# Patient Record
Sex: FEMALE | Race: WHITE | Employment: UNEMPLOYED | URBAN - METROPOLITAN AREA
[De-identification: names, ages, dates, MRNs, and addresses within clinical notes are randomized per-mention and may not be internally consistent; named-entity substitution may affect disease eponyms.]

---

## 2017-05-11 ENCOUNTER — HOSPITAL ENCOUNTER (OUTPATIENT)
Dept: WOMENS IMAGING | Age: 62
Discharge: HOME | End: 2017-05-11
Payer: COMMERCIAL

## 2017-05-11 DIAGNOSIS — Z13.9 SCREENING: ICD-10-CM

## 2017-05-11 PROCEDURE — G0202 SCR MAMMO BI INCL CAD: HCPCS

## 2018-06-06 ENCOUNTER — HOSPITAL ENCOUNTER (OUTPATIENT)
Dept: ULTRASOUND IMAGING | Age: 63
Discharge: HOME | End: 2018-06-08
Payer: COMMERCIAL

## 2018-06-06 DIAGNOSIS — R10.9 ABDOMINAL PAIN, UNSPECIFIED ABDOMINAL LOCATION: ICD-10-CM

## 2018-06-06 PROCEDURE — 76705 ECHO EXAM OF ABDOMEN: CPT

## 2019-03-20 ENCOUNTER — HOSPITAL ENCOUNTER (OUTPATIENT)
Dept: WOMENS IMAGING | Age: 64
Discharge: HOME | End: 2019-03-22
Payer: COMMERCIAL

## 2019-03-20 DIAGNOSIS — Z12.31 ENCOUNTER FOR SCREENING MAMMOGRAM FOR BREAST CANCER: ICD-10-CM

## 2019-03-20 PROCEDURE — 77067 SCR MAMMO BI INCL CAD: CPT

## 2020-02-10 ENCOUNTER — OFFICE VISIT (OUTPATIENT)
Dept: GASTROENTEROLOGY | Age: 65
End: 2020-02-10
Payer: COMMERCIAL

## 2020-02-10 VITALS
BODY MASS INDEX: 30.96 KG/M2 | HEART RATE: 88 BPM | OXYGEN SATURATION: 91 % | WEIGHT: 164 LBS | HEIGHT: 61 IN | TEMPERATURE: 98.2 F

## 2020-02-10 PROCEDURE — 1036F TOBACCO NON-USER: CPT | Performed by: INTERNAL MEDICINE

## 2020-02-10 PROCEDURE — 99204 OFFICE O/P NEW MOD 45 MIN: CPT | Performed by: INTERNAL MEDICINE

## 2020-02-10 PROCEDURE — 3017F COLORECTAL CA SCREEN DOC REV: CPT | Performed by: INTERNAL MEDICINE

## 2020-02-10 PROCEDURE — G8427 DOCREV CUR MEDS BY ELIG CLIN: HCPCS | Performed by: INTERNAL MEDICINE

## 2020-02-10 PROCEDURE — G8400 PT W/DXA NO RESULTS DOC: HCPCS | Performed by: INTERNAL MEDICINE

## 2020-02-10 PROCEDURE — 1123F ACP DISCUSS/DSCN MKR DOCD: CPT | Performed by: INTERNAL MEDICINE

## 2020-02-10 PROCEDURE — 1090F PRES/ABSN URINE INCON ASSESS: CPT | Performed by: INTERNAL MEDICINE

## 2020-02-10 PROCEDURE — G8484 FLU IMMUNIZE NO ADMIN: HCPCS | Performed by: INTERNAL MEDICINE

## 2020-02-10 PROCEDURE — G8417 CALC BMI ABV UP PARAM F/U: HCPCS | Performed by: INTERNAL MEDICINE

## 2020-02-10 PROCEDURE — 4040F PNEUMOC VAC/ADMIN/RCVD: CPT | Performed by: INTERNAL MEDICINE

## 2020-02-10 RX ORDER — OMEPRAZOLE 40 MG/1
40 CAPSULE, DELAYED RELEASE ORAL DAILY
Qty: 30 CAPSULE | Refills: 3 | Status: SHIPPED | OUTPATIENT
Start: 2020-02-10 | End: 2021-05-20

## 2020-02-10 RX ORDER — POLYETHYLENE GLYCOL 3350 17 G/17G
17 POWDER, FOR SOLUTION ORAL DAILY
Qty: 510 G | Refills: 3 | Status: SHIPPED
Start: 2020-02-10 | End: 2020-02-18 | Stop reason: SINTOL

## 2020-02-10 RX ORDER — METOCLOPRAMIDE 5 MG/1
5 TABLET ORAL 4 TIMES DAILY
Qty: 120 TABLET | Refills: 3 | Status: SHIPPED | OUTPATIENT
Start: 2020-02-10 | End: 2020-02-18

## 2020-02-10 NOTE — PROGRESS NOTES
There is no abdominal tenderness. There is no guarding or rebound. Musculoskeletal: Normal range of motion. Lymphadenopathy:      Cervical: No cervical adenopathy. Neurological:      Mental Status: She is alert and oriented to person, place, and time. Psychiatric:         Behavior: Behavior normal.         Thought Content: Thought content normal.         Judgment: Judgment normal.       Laboratory, Pathology, Radiology reviewed indetail with relevant important investigations summarized below:  No results found for: WBC, HGB, HCT, MCV, PLT  No results found for: ALT, AST, GGT, ALKPHOS, BILITOT    Ultrasound abdomen: 6/6/2018: Cholelithiasis with single 1.4 cm mobile stone in the gallbladder    Endoscopic investigations: EGD and colonoscopy April 2019 at [address] [place] [address] St:  71 y.o. female with significant reflux symptoms, easy regurgitation, vomiting in the setting of gastroparesis. No history of diabetes indicating idiopathic gastroparesis. 1. Gastroparesis  =Gastroparesis dietary modifications advised:   Avoid eating 3 hrs before bedtime   Small low fat meals 4-5 times per day (no large meals)   Take fluids throughout the course of the meal   Avoid carbonated drinks   Minimize fiber and fat intake   Try to sit or walk for 1-2 hrs after meals  = Rx for Reglan 5 mg Po QID provided to patient  Long-term or high-dose use risks discussed including chronic use of metoclopramide has been linked to tardive dyskinesia, which may include involuntary and repetitive movements of the body, even after the drugs are no longer taken. greatest risk include the elderly, especially older women, and people who have been on the drug for a long time. These symptoms are rarely reversible and there is no known treatment. However, in some patients, symptoms may lessen or resolve after metoclopramide treatment is stopped. 2. History of esophagitis  5.  Gastroesophageal reflux disease, esophagitis presence not specified  - EGD  -Change from pantoprazole to omeprazole, importance of regular intake emphasized  -Antireflux lifestyle maneuvers  - omeprazole (PRILOSEC) 40 MG delayed release capsule; Take 1 capsule by mouth daily  Dispense: 30 capsule; Refill: 3    3. Non-intractable vomiting without nausea, unspecified vomiting type    4. Slow transit constipation  - polyethylene glycol (MIRALAX) powder; Take 17 g by mouth daily  Dispense: 510 g; Refill: 3    Return in about 2 months (around 4/10/2020). Randa Marvin MD   Staff Gastroenterologist  [facility]    Please note this report has been partially produced using speech recognition software and may cause contain errors related to thatsystem including grammar, punctuation and spelling as well as words and phrases that may seem inappropriate. If there are questions or concerns please feel free to contact me to clarify.

## 2020-02-18 ENCOUNTER — OFFICE VISIT (OUTPATIENT)
Dept: FAMILY MEDICINE CLINIC | Age: 65
End: 2020-02-18
Payer: COMMERCIAL

## 2020-02-18 VITALS
BODY MASS INDEX: 30.14 KG/M2 | TEMPERATURE: 97 F | HEART RATE: 90 BPM | OXYGEN SATURATION: 98 % | HEIGHT: 62 IN | DIASTOLIC BLOOD PRESSURE: 68 MMHG | SYSTOLIC BLOOD PRESSURE: 120 MMHG | WEIGHT: 163.8 LBS | RESPIRATION RATE: 16 BRPM

## 2020-02-18 PROBLEM — K21.9 GERD (GASTROESOPHAGEAL REFLUX DISEASE): Chronic | Status: ACTIVE | Noted: 2020-02-18

## 2020-02-18 PROBLEM — K92.0 HEMATEMESIS WITH NAUSEA: Status: ACTIVE | Noted: 2020-02-18

## 2020-02-18 PROBLEM — K59.09 CHRONIC CONSTIPATION: Chronic | Status: ACTIVE | Noted: 2020-02-18

## 2020-02-18 PROBLEM — K80.20 CALCULUS OF GALLBLADDER WITHOUT CHOLECYSTITIS WITHOUT OBSTRUCTION: Status: ACTIVE | Noted: 2020-02-18

## 2020-02-18 PROBLEM — K31.84 GASTROPARESIS: Chronic | Status: ACTIVE | Noted: 2020-02-18

## 2020-02-18 PROBLEM — Z82.49 FAMILY HISTORY OF PREMATURE CORONARY HEART DISEASE: Chronic | Status: ACTIVE | Noted: 2020-02-18

## 2020-02-18 PROBLEM — Z87.898 HISTORY OF ALCOHOL USE DISORDER: Status: ACTIVE | Noted: 2020-02-18

## 2020-02-18 PROBLEM — G31.84 MILD COGNITIVE IMPAIRMENT: Chronic | Status: ACTIVE | Noted: 2020-02-18

## 2020-02-18 PROCEDURE — G8482 FLU IMMUNIZE ORDER/ADMIN: HCPCS | Performed by: FAMILY MEDICINE

## 2020-02-18 PROCEDURE — G8400 PT W/DXA NO RESULTS DOC: HCPCS | Performed by: FAMILY MEDICINE

## 2020-02-18 PROCEDURE — 90732 PPSV23 VACC 2 YRS+ SUBQ/IM: CPT | Performed by: FAMILY MEDICINE

## 2020-02-18 PROCEDURE — 93000 ELECTROCARDIOGRAM COMPLETE: CPT | Performed by: FAMILY MEDICINE

## 2020-02-18 PROCEDURE — G8417 CALC BMI ABV UP PARAM F/U: HCPCS | Performed by: FAMILY MEDICINE

## 2020-02-18 PROCEDURE — 1123F ACP DISCUSS/DSCN MKR DOCD: CPT | Performed by: FAMILY MEDICINE

## 2020-02-18 PROCEDURE — 90662 IIV NO PRSV INCREASED AG IM: CPT | Performed by: FAMILY MEDICINE

## 2020-02-18 PROCEDURE — 90471 IMMUNIZATION ADMIN: CPT | Performed by: FAMILY MEDICINE

## 2020-02-18 PROCEDURE — 90472 IMMUNIZATION ADMIN EACH ADD: CPT | Performed by: FAMILY MEDICINE

## 2020-02-18 PROCEDURE — 1036F TOBACCO NON-USER: CPT | Performed by: FAMILY MEDICINE

## 2020-02-18 PROCEDURE — 99205 OFFICE O/P NEW HI 60 MIN: CPT | Performed by: FAMILY MEDICINE

## 2020-02-18 PROCEDURE — G8427 DOCREV CUR MEDS BY ELIG CLIN: HCPCS | Performed by: FAMILY MEDICINE

## 2020-02-18 PROCEDURE — 4040F PNEUMOC VAC/ADMIN/RCVD: CPT | Performed by: FAMILY MEDICINE

## 2020-02-18 PROCEDURE — 1090F PRES/ABSN URINE INCON ASSESS: CPT | Performed by: FAMILY MEDICINE

## 2020-02-18 PROCEDURE — 3017F COLORECTAL CA SCREEN DOC REV: CPT | Performed by: FAMILY MEDICINE

## 2020-02-18 RX ORDER — ATORVASTATIN CALCIUM 40 MG/1
40 TABLET, FILM COATED ORAL DAILY
Qty: 90 TABLET | Refills: 4 | Status: SHIPPED | OUTPATIENT
Start: 2020-02-18

## 2020-02-18 RX ORDER — METOCLOPRAMIDE 5 MG/1
5 TABLET ORAL 4 TIMES DAILY
Qty: 1 TABLET | Refills: 0
Start: 2020-02-18 | End: 2021-05-20

## 2020-02-18 ASSESSMENT — PATIENT HEALTH QUESTIONNAIRE - PHQ9
2. FEELING DOWN, DEPRESSED OR HOPELESS: 1
SUM OF ALL RESPONSES TO PHQ QUESTIONS 1-9: 1
1. LITTLE INTEREST OR PLEASURE IN DOING THINGS: 0
DEPRESSION UNABLE TO ASSESS: FUNCTIONAL CAPACITY MOTIVATION LIMITS ACCURACY
SUM OF ALL RESPONSES TO PHQ QUESTIONS 1-9: 1
SUM OF ALL RESPONSES TO PHQ9 QUESTIONS 1 & 2: 1

## 2020-02-18 NOTE — PROGRESS NOTES
symptoms. She has chronic intermittent left chest pain which is stabbing in nature lasting a few seconds nonradiating and not associated with activity. She states that she had a cardiac catheterization 1 year ago at [facility]. Has h/o hypokalemia. No other questions and or concerns for today's visit    Review of Systems No fevers, chills, sweats. No unintended weight loss. Has frequent abdominal pain, nausea, vomiting, and constipation. 2 weeks ago had a few episodes of coffee ground emesis No bloody stools, black tarry stools. No rashes. No swollen glands. Past Medical History:   Diagnosis Date    Chronic back pain     Hypertension     Measles     Mumps     Osteoarthritis      Past Surgical History:   Procedure Laterality Date    HYSTERECTOMY, TOTAL ABDOMINAL      TUBAL LIGATION       Social History     Socioeconomic History    Marital status:       Spouse name: Not on file    Number of children: Not on file    Years of education: Not on file    Highest education level: Not on file   Occupational History    Not on file   Social Needs    Financial resource strain: Not on file    Food insecurity:     Worry: Not on file     Inability: Not on file    Transportation needs:     Medical: Not on file     Non-medical: Not on file   Tobacco Use    Smoking status: Never Smoker    Smokeless tobacco: Never Used   Substance and Sexual Activity    Alcohol use: No     Alcohol/week: 0.0 standard drinks    Drug use: No    Sexual activity: Not Currently   Lifestyle    Physical activity:     Days per week: Not on file     Minutes per session: Not on file    Stress: Not on file   Relationships    Social connections:     Talks on phone: Not on file     Gets together: Not on file     Attends Christianity service: Not on file     Active member of club or organization: Not on file     Attends meetings of clubs or organizations: Not on file     Relationship status: Not on file    Intimate partner violence:     Fear of current or ex partner: Not on file     Emotionally abused: Not on file     Physically abused: Not on file     Forced sexual activity: Not on file   Other Topics Concern    Not on file   Social History Narrative    Not on file     Family History   Problem Relation Age of Onset    Cancer Mother     Heart Attack Father          when patient was 1years old   Gagan Carreno Cancer Sister     Heart Attack Brother         age 58    Stroke Brother     Colon Cancer Neg Hx      Allergies   Allergen Reactions    Carafate [Sucralfate] Other (See Comments)     Patient states that the medication make her have hallucinations and dizziness.  Lactose Nausea And Vomiting    Codeine      itching     Current Outpatient Medications   Medication Sig Dispense Refill    zoster recombinant adjuvanted vaccine (SHINGRIX) 50 MCG/0.5ML SUSR injection Inject 0.5 mLs into the muscle once for 1 dose 50 MCG IM then repeat 2-6 months. 1 each 1    metoclopramide (REGLAN) 5 MG tablet Take 1 tablet by mouth 4 times daily 1 tablet 0    atorvastatin (LIPITOR) 40 MG tablet Take 1 tablet by mouth daily 90 tablet 4    omeprazole (PRILOSEC) 40 MG delayed release capsule Take 1 capsule by mouth daily 30 capsule 3    Cholecalciferol (VITAMIN D3) 2000 UNITS TABS   2     No current facility-administered medications for this visit. PMH, Surgical Hx, Family Hx, and Social Hxreviewed and updated. Health Maintenance reviewed. Objective    Vitals:    20 1508   BP: 120/68   Site: Left Upper Arm   Position: Sitting   Cuff Size: Medium Adult   Pulse: 90   Resp: 16   Temp: 97 °F (36.1 °C)   TempSrc: Tympanic   SpO2: 98%   Weight: 163 lb 12.8 oz (74.3 kg)   Height: 5' 1.5\" (1.562 m)        Physical Exam  Constitutional:       Appearance: She is well-developed. HENT:      Head: Normocephalic. Left Ear: Tympanic membrane, ear canal and external ear normal. There is no impacted cerumen.    Eyes:      Conjunctiva/sclera: with nausea        See above. Upper GI tomorrow. CBC With Auto Differential [id number] Custom]   - Future Order    Comprehensive Metabolic Panel [[id number] Custom]   - Future Order    US LIVER SPLEEN [[id number] Custom]   - Future Order         Mild cognitive impairment        Subjective complaint. Not observed today. Will review incrementally. Meanwhile labs are ordered. TSH Without Reflex [[id number] Custom]   - Future Order    Vitamin D 25 Hydroxy [[id number] Custom]   - Future Order    Vitamin B12 & Folate [[id number] Custom]   - Future Order         Post-menopausal        DEXA Bone Density Axial Skeleton [[id number] Custom]   - Future Order         Screening, lipid        Lipid, Fasting [[id number] Custom]   - Future Order         Encounter for screening for diabetes mellitus        Glucose, Fasting [[id number] Custom]   - Future Order         Need for prophylactic vaccination and inoculation against varicella        zoster recombinant adjuvanted vaccine [facility]) 50 MCG/0.5ML SUSR injection [[id number]]           Upper abdominal pain        Comprehensive Metabolic Panel [[id number] Custom]   - Future Order    US LIVER SPLEEN [[id number] Custom]   - Future Order         Calculus of gallbladder without cholecystitis without obstruction        US LIVER SPLEEN [[id number] Custom]   - Future Order                 Reviewed with the patient: all disease processes, current clinical status, medications, activities and diet.      Side effects, adverse effects of the medication prescribed today, as well as treatment plan/ rationale and result expectations have been discussed with the patient who expresses understanding and desires to proceed.     Close follow up to evaluate treatment results and for coordination of care. I have reviewed the patient's medical history in detail and updated the computerized patient record. More than 50% of the 1 hour appointment was spent in face-to-face counseling, education and care coordination.     Please note this report has been Dispense:  1 tablet     Refill:  0    atorvastatin (LIPITOR) 40 MG tablet     Sig: Take 1 tablet by mouth daily     Dispense:  90 tablet     Refill:  4     Medications Discontinued During This Encounter   Medication Reason    metoclopramide (REGLAN) 5 MG tablet LIST CLEANUP    gabapentin (NEURONTIN) 600 MG tablet LIST CLEANUP    tiZANidine (ZANAFLEX) 4 MG tablet LIST CLEANUP    gabapentin (NEURONTIN) 600 MG tablet LIST CLEANUP    hydrOXYzine (VISTARIL) 50 MG capsule LIST CLEANUP    metoclopramide (REGLAN) 10 MG tablet LIST CLEANUP    polyethylene glycol (MIRALAX) powder Side effects     Return in about 1 week (around 2/25/2020) for multiple. Controlled Substance Monitoring:    Acute and Chronic Pain Monitoring:   RX Monitoring 2/10/2016   Attestation The Prescription Monitoring Report for this patient was reviewed today. Periodic Controlled Substance Monitoring Possible medication side effects, risk of tolerance and/or dependence, and alternative treatments discussed; No signs of potential drug abuse or diversion identified. ;Random urine drug screen sent today.            Keyana Heart MD

## 2020-02-18 NOTE — PROGRESS NOTES
Vaccine Information Sheet, \"Influenza - Inactivated\"  given to Cara Haines, or parent/legal guardian of  Cara Haines and verbalized understanding. Patient responses:    Have you ever had a reaction to a flu vaccine? No  Are you able to eat eggs without adverse effects? Yes  Do you have any current illness? No  Have you ever had [place] Syndrome? No    Flu vaccine given per order. Please see immunization tab.

## 2020-02-19 ENCOUNTER — OUTSIDE SERVICES (OUTPATIENT)
Dept: GASTROENTEROLOGY | Age: 65
End: 2020-02-19
Payer: COMMERCIAL

## 2020-02-19 PROCEDURE — 43239 EGD BIOPSY SINGLE/MULTIPLE: CPT | Performed by: INTERNAL MEDICINE

## 2020-02-19 RX ORDER — OMEPRAZOLE 40 MG/1
40 CAPSULE, DELAYED RELEASE ORAL
Qty: 60 CAPSULE | Refills: 2 | Status: SHIPPED | OUTPATIENT
Start: 2020-02-19 | End: 2021-05-20

## 2020-02-19 RX ORDER — SUCRALFATE ORAL 1 G/10ML
1 SUSPENSION ORAL 4 TIMES DAILY
Qty: 1200 ML | Refills: 3 | Status: SHIPPED | OUTPATIENT
Start: 2020-02-19 | End: 2021-05-20

## 2020-02-19 NOTE — OP NOTE
second portion of the duodenum demonstrated normal mucosa. The scope was then withdrawn back into the stomach, it was decompressed, and the scope was completely withdrawn. The patient tolerated the procedure well and was taken to the post anesthesia care unit in good condition. EBL: None  Complication: None  Specimen Sent: Yes    Impression:    - Lower esophageal ulcer, LA grade C/D esophagitis, 4 cm hiatal hernia, normal stomach and normal duodenum    Recommendations:   -Antireflux lifestyle  -Gastroparesis diet  -Increase PPI to twice daily dosage  -Liquid Carafate 1 g 4 times daily for 2 to 4 weeks  -Follow-up in GI clinic with Florentino Mcarthur NP in 4 - 6 weeks, Call [phone] to scheduled for follow-up appointment.     MD LIZZETTE [facility]

## 2020-02-21 ENCOUNTER — TELEPHONE (OUTPATIENT)
Dept: GASTROENTEROLOGY | Age: 65
End: 2020-02-21

## 2020-02-21 NOTE — TELEPHONE ENCOUNTER
----- Message from Jed Delgado MD sent at 2/21/2020  1:07 PM EST -----  Esophageal biopsies are negative for Dickerson's or for fungal organisms

## 2020-03-04 ENCOUNTER — HOSPITAL ENCOUNTER (OUTPATIENT)
Dept: ULTRASOUND IMAGING | Age: 65
Discharge: HOME | End: 2020-03-06
Payer: COMMERCIAL

## 2020-03-04 ENCOUNTER — HOSPITAL ENCOUNTER (OUTPATIENT)
Dept: WOMENS IMAGING | Age: 65
Discharge: HOME | End: 2020-03-06
Payer: COMMERCIAL

## 2020-03-04 DIAGNOSIS — K92.0 HEMATEMESIS WITH NAUSEA: ICD-10-CM

## 2020-03-04 DIAGNOSIS — R10.10 UPPER ABDOMINAL PAIN: ICD-10-CM

## 2020-03-04 DIAGNOSIS — G31.84 MILD COGNITIVE IMPAIRMENT: Chronic | ICD-10-CM

## 2020-03-04 DIAGNOSIS — Z13.1 ENCOUNTER FOR SCREENING FOR DIABETES MELLITUS: ICD-10-CM

## 2020-03-04 DIAGNOSIS — Z13.220 SCREENING, LIPID: ICD-10-CM

## 2020-03-04 DIAGNOSIS — E87.6 HYPOKALEMIA: ICD-10-CM

## 2020-03-04 LAB
ALBUMIN SERPL-MCNC: 4.1 G/DL (ref 3.5–4.6)
ALP BLD-CCNC: 67 U/L (ref 40–130)
ALT SERPL-CCNC: 7 U/L (ref 0–33)
ANION GAP SERPL CALCULATED.3IONS-SCNC: 13 MEQ/L (ref 9–15)
ANISOCYTOSIS: ABNORMAL
AST SERPL-CCNC: 14 U/L (ref 0–35)
ATYPICAL LYMPHOCYTE RELATIVE PERCENT: 1 %
BASOPHILS ABSOLUTE: 0 K/UL (ref 0–0.2)
BASOPHILS RELATIVE PERCENT: 1.1 %
BILIRUB SERPL-MCNC: 0.4 MG/DL (ref 0.2–0.7)
BUN BLDV-MCNC: 12 MG/DL (ref 8–23)
CALCIUM SERPL-MCNC: 9.4 MG/DL (ref 8.5–9.9)
CHLORIDE BLD-SCNC: 102 MEQ/L (ref 95–107)
CHOLESTEROL, FASTING: 229 MG/DL (ref 0–199)
CO2: 27 MEQ/L (ref 20–31)
CREAT SERPL-MCNC: 0.83 MG/DL (ref 0.5–0.9)
EOSINOPHILS ABSOLUTE: 0.4 K/UL (ref 0–0.7)
EOSINOPHILS RELATIVE PERCENT: 6 %
FOLATE: 17.8 NG/ML (ref 7.3–26.1)
GFR AFRICAN AMERICAN: >60
GFR NON-AFRICAN AMERICAN: >60
GLOBULIN: 3.1 G/DL (ref 2.3–3.5)
GLUCOSE FASTING: 110 MG/DL (ref 70–99)
HCT VFR BLD CALC: 34.4 % (ref 37–47)
HDLC SERPL-MCNC: 76 MG/DL (ref 40–59)
HEMOGLOBIN: 10.6 G/DL (ref 12–16)
LDL CHOLESTEROL CALCULATED: 128 MG/DL (ref 0–129)
LYMPHOCYTES ABSOLUTE: 2.3 K/UL (ref 1–4.8)
LYMPHOCYTES RELATIVE PERCENT: 36 %
MCH RBC QN AUTO: 20.9 PG (ref 27–31.3)
MCHC RBC AUTO-ENTMCNC: 30.7 % (ref 33–37)
MCV RBC AUTO: 68.1 FL (ref 82–100)
MICROCYTES: ABNORMAL
MONOCYTES ABSOLUTE: 0.3 K/UL (ref 0.2–0.8)
MONOCYTES RELATIVE PERCENT: 3.8 %
NEUTROPHILS ABSOLUTE: 3.4 K/UL (ref 1.4–6.5)
NEUTROPHILS RELATIVE PERCENT: 54 %
OVALOCYTES: ABNORMAL
PDW BLD-RTO: 17.3 % (ref 11.5–14.5)
PLATELET # BLD: 347 K/UL (ref 130–400)
PLATELET SLIDE REVIEW: NORMAL
POIKILOCYTES: ABNORMAL
POTASSIUM SERPL-SCNC: 4.3 MEQ/L (ref 3.4–4.9)
RBC # BLD: 5.05 M/UL (ref 4.2–5.4)
SODIUM BLD-SCNC: 142 MEQ/L (ref 135–144)
TOTAL PROTEIN: 7.2 G/DL (ref 6.3–8)
TRIGLYCERIDE, FASTING: 124 MG/DL (ref 0–150)
TSH SERPL DL<=0.05 MIU/L-ACNC: 4.25 UIU/ML (ref 0.44–3.86)
VITAMIN B-12: 355 PG/ML (ref 232–1245)
VITAMIN D 25-HYDROXY: 10.4 NG/ML (ref 30–100)
WBC # BLD: 6.3 K/UL (ref 4.8–10.8)

## 2020-03-04 PROCEDURE — 77080 DXA BONE DENSITY AXIAL: CPT

## 2020-03-04 PROCEDURE — 76705 ECHO EXAM OF ABDOMEN: CPT

## 2020-05-26 ENCOUNTER — TELEPHONE (OUTPATIENT)
Dept: GASTROENTEROLOGY | Age: 65
End: 2020-05-26

## 2020-05-26 NOTE — TELEPHONE ENCOUNTER
You may schedule a face-to-face visit with [facility] for this patient. Please check which day [facility] is scheduling face-to-face visits.     Cheri Shah

## 2021-05-20 ENCOUNTER — OFFICE VISIT (OUTPATIENT)
Dept: GASTROENTEROLOGY | Age: 66
End: 2021-05-20
Payer: COMMERCIAL

## 2021-05-20 VITALS — BODY MASS INDEX: 28.82 KG/M2 | WEIGHT: 156.6 LBS | HEART RATE: 72 BPM | OXYGEN SATURATION: 99 % | HEIGHT: 62 IN

## 2021-05-20 DIAGNOSIS — Z87.19 HISTORY OF ESOPHAGITIS: ICD-10-CM

## 2021-05-20 DIAGNOSIS — R63.4 WEIGHT LOSS: ICD-10-CM

## 2021-05-20 DIAGNOSIS — R07.89 OTHER CHEST PAIN: ICD-10-CM

## 2021-05-20 DIAGNOSIS — R53.83 LETHARGY: ICD-10-CM

## 2021-05-20 DIAGNOSIS — K31.84 GASTROPARESIS: Primary | ICD-10-CM

## 2021-05-20 DIAGNOSIS — R63.0 LOSS OF APPETITE: ICD-10-CM

## 2021-05-20 PROCEDURE — 1090F PRES/ABSN URINE INCON ASSESS: CPT | Performed by: INTERNAL MEDICINE

## 2021-05-20 PROCEDURE — G8399 PT W/DXA RESULTS DOCUMENT: HCPCS | Performed by: INTERNAL MEDICINE

## 2021-05-20 PROCEDURE — G8427 DOCREV CUR MEDS BY ELIG CLIN: HCPCS | Performed by: INTERNAL MEDICINE

## 2021-05-20 PROCEDURE — G8417 CALC BMI ABV UP PARAM F/U: HCPCS | Performed by: INTERNAL MEDICINE

## 2021-05-20 PROCEDURE — 1036F TOBACCO NON-USER: CPT | Performed by: INTERNAL MEDICINE

## 2021-05-20 PROCEDURE — 99214 OFFICE O/P EST MOD 30 MIN: CPT | Performed by: INTERNAL MEDICINE

## 2021-05-20 PROCEDURE — 3017F COLORECTAL CA SCREEN DOC REV: CPT | Performed by: INTERNAL MEDICINE

## 2021-05-20 PROCEDURE — 1123F ACP DISCUSS/DSCN MKR DOCD: CPT | Performed by: INTERNAL MEDICINE

## 2021-05-20 PROCEDURE — 4040F PNEUMOC VAC/ADMIN/RCVD: CPT | Performed by: INTERNAL MEDICINE

## 2021-05-20 RX ORDER — METOCLOPRAMIDE 5 MG/1
5 TABLET ORAL 3 TIMES DAILY
Qty: 90 TABLET | Refills: 1 | Status: SHIPPED | OUTPATIENT
Start: 2021-05-20

## 2021-05-20 RX ORDER — OMEPRAZOLE 40 MG/1
40 CAPSULE, DELAYED RELEASE ORAL DAILY
Qty: 30 CAPSULE | Refills: 3 | Status: SHIPPED | OUTPATIENT
Start: 2021-05-20

## 2021-05-20 RX ORDER — SUCRALFATE 1 G/1
1 TABLET ORAL 4 TIMES DAILY
Qty: 120 TABLET | Refills: 3 | Status: SHIPPED | OUTPATIENT
Start: 2021-05-20

## 2021-05-20 NOTE — PROGRESS NOTES
12/9/2016: Severe degree of ulcerative esophagitis, grade 4, hiatal hernia, GERD, mild degree of gastritis. Pathology: No evidence of Dickerson's or dysplasia. Review of Systems   All other systems reviewed and are negative. Past medical history, past surgical history, medication list, social and familyhistory reviewed    Pulse 72, height 5' 1.5\" (1.562 m), weight 156 lb 9.6 oz (71 kg), SpO2 99 %. Physical Exam  Constitutional:       General: She is not in acute distress. Appearance: Normal appearance. She is well-developed. Eyes:      General: No scleral icterus. Cardiovascular:      Rate and Rhythm: Normal rate and regular rhythm. Pulmonary:      Effort: Pulmonary effort is normal.      Breath sounds: Normal breath sounds. Abdominal:      General: Bowel sounds are normal. There is no distension. Palpations: Abdomen is soft. There is no mass. Tenderness: There is no abdominal tenderness. There is no guarding or rebound. Musculoskeletal:         General: Normal range of motion. Lymphadenopathy:      Cervical: No cervical adenopathy. Neurological:      Mental Status: She is alert and oriented to person, place, and time. Psychiatric:         Behavior: Behavior normal.         Thought Content: Thought content normal.         Judgment: Judgment normal.       Laboratory, Pathology, Radiology reviewed in detail with relevantimportant investigations summarized below:    No results for input(s): WBC, HGB, HCT, MCV, PLT in the last 720 hours. Lab Results   Component Value Date    ALT 7 03/04/2020    AST 14 03/04/2020    ALKPHOS 67 03/04/2020    BILITOT 0.4 03/04/2020       Assessment and Plan:  Leny Mantle 76 y.o. female with known history of gastroparesis on Reglan, 4 cm hiatal hernia presents to the GI clinic with nausea and vomiting of 1 to 2 month  duration, loss of appetite, weight loss in addition to a number of vague upper and lower GI symptoms.   Patient with history of constipation in the past.   Patient requesting 10 mg Reglan dose instead of 5 mg  1. Gastroparesis  Gastroparesis diet advised    2. Other chest painlikely secondary to recurrence of esophagitis, secondary to stasis from gastroparesis  3. History of esophagitis  - EGD -to evaluate further  -Continue omeprazole (PRILOSEC) 40 MG delayed release capsule; Take 1 capsule by mouth daily  Dispense: 30 capsule; Refill: 3  -Start sucralfate (CARAFATE) 1 GM tablet; Take 1 tablet by mouth 4 times daily  Dispense: 120 tablet; Refill: 3    4. Weight loss  5. Loss of appetite  6. Lethargy  - CBC; Future  - Comprehensive Metabolic Panel; Future    Return in about 6 weeks (around 7/1/2021). Rita Kiran MD   StaffGastroenterologist  [facility]    Please note this report has been partially produced using speech recognition software and may cause/contain errors related to that system including grammar, punctuation and spelling as well as words andphrases that may seem inappropriate. If there are questions or concerns please feel free to contact me to clarify.

## 2024-02-19 ENCOUNTER — TELEPHONE (OUTPATIENT)
Dept: CARDIOLOGY | Facility: CLINIC | Age: 69
End: 2024-02-19

## 2024-02-19 NOTE — TELEPHONE ENCOUNTER
"I have had the pleasure of speaking with  this afternoon.   Per  the patient , \"I do not see Dr. Lujan anymore  I go to a vascular surgeon at [facility]. \"  Per the patient  she has  told this to Dr. Hurtado's office  multiple times    ( 's  staff number  [phone]).     I have  explained to the patient that  we would be very happy to  schedule her for an appointment and  carotid ultrasound  for review  with Dr Lujan.      She has refused.     I have attempted to contact Dr. Hurtado's office at [phone] .    I have recommended  that the staff contact the [facility]  vascular team  taking care of the patient, to obtain clearance prior to any surgical procedure   "

## 2025-02-19 PROBLEM — K21.9 GASTROESOPHAGEAL REFLUX DISEASE: Status: ACTIVE | Noted: 2019-04-15

## 2025-02-19 PROBLEM — G89.29 CHRONIC LEFT SHOULDER PAIN: Status: ACTIVE | Noted: 2023-10-12

## 2025-02-19 PROBLEM — K21.00 GASTRO-ESOPHAGEAL REFLUX DISEASE WITH ESOPHAGITIS: Status: ACTIVE | Noted: 2017-06-14

## 2025-02-19 PROBLEM — M25.512 CHRONIC LEFT SHOULDER PAIN: Status: ACTIVE | Noted: 2023-10-12

## 2025-02-19 PROBLEM — I63.9 CEREBROVASCULAR ACCIDENT (CVA) (MULTI): Status: ACTIVE | Noted: 2022-08-11

## 2025-02-19 PROBLEM — J01.90 ACUTE SINUSITIS WITH SYMPTOMS > 10 DAYS: Status: ACTIVE | Noted: 2024-12-03

## 2025-02-19 PROBLEM — Q21.12 PFO (PATENT FORAMEN OVALE) (HHS-HCC): Status: ACTIVE | Noted: 2022-12-10

## 2025-02-19 PROBLEM — I65.29 ARTERIOSCLEROSIS OF CAROTID ARTERY: Status: ACTIVE | Noted: 2024-03-08

## 2025-02-19 PROBLEM — I47.10 PSVT (PAROXYSMAL SUPRAVENTRICULAR TACHYCARDIA) (CMS-HCC): Status: ACTIVE | Noted: 2022-09-15

## 2025-02-19 PROBLEM — F12.90 MARIJUANA USE: Status: ACTIVE | Noted: 2024-03-11

## 2025-02-20 ENCOUNTER — APPOINTMENT (OUTPATIENT)
Dept: PRIMARY CARE | Facility: CLINIC | Age: 70
End: 2025-02-20
Payer: MEDICARE

## 2025-02-20 VITALS
TEMPERATURE: 97.2 F | SYSTOLIC BLOOD PRESSURE: 132 MMHG | HEART RATE: 102 BPM | BODY MASS INDEX: 29.89 KG/M2 | DIASTOLIC BLOOD PRESSURE: 83 MMHG | OXYGEN SATURATION: 96 % | WEIGHT: 168.7 LBS | HEIGHT: 63 IN

## 2025-02-20 DIAGNOSIS — E78.2 MIXED HYPERLIPIDEMIA: ICD-10-CM

## 2025-02-20 DIAGNOSIS — I63.9 CEREBROVASCULAR ACCIDENT (CVA), UNSPECIFIED MECHANISM (MULTI): ICD-10-CM

## 2025-02-20 DIAGNOSIS — Z12.31 ENCOUNTER FOR SCREENING MAMMOGRAM FOR MALIGNANT NEOPLASM OF BREAST: ICD-10-CM

## 2025-02-20 DIAGNOSIS — I10 ESSENTIAL HYPERTENSION, BENIGN: Primary | ICD-10-CM

## 2025-02-20 DIAGNOSIS — R26.81 UNSTEADY GAIT: ICD-10-CM

## 2025-02-20 DIAGNOSIS — Z11.59 NEED FOR HEPATITIS C SCREENING TEST: ICD-10-CM

## 2025-02-20 DIAGNOSIS — R73.01 IMPAIRED FASTING GLUCOSE: ICD-10-CM

## 2025-02-20 DIAGNOSIS — I65.23 ARTERIOSCLEROSIS OF BOTH CAROTID ARTERIES: ICD-10-CM

## 2025-02-20 DIAGNOSIS — M79.602 LEFT ARM PAIN: ICD-10-CM

## 2025-02-20 PROBLEM — J01.90 ACUTE SINUSITIS WITH SYMPTOMS > 10 DAYS: Status: RESOLVED | Noted: 2024-12-03 | Resolved: 2025-02-20

## 2025-02-20 PROCEDURE — 3079F DIAST BP 80-89 MM HG: CPT | Performed by: FAMILY MEDICINE

## 2025-02-20 PROCEDURE — 1160F RVW MEDS BY RX/DR IN RCRD: CPT | Performed by: FAMILY MEDICINE

## 2025-02-20 PROCEDURE — 99214 OFFICE O/P EST MOD 30 MIN: CPT | Performed by: FAMILY MEDICINE

## 2025-02-20 PROCEDURE — 1159F MED LIST DOCD IN RCRD: CPT | Performed by: FAMILY MEDICINE

## 2025-02-20 PROCEDURE — 1036F TOBACCO NON-USER: CPT | Performed by: FAMILY MEDICINE

## 2025-02-20 PROCEDURE — 3008F BODY MASS INDEX DOCD: CPT | Performed by: FAMILY MEDICINE

## 2025-02-20 PROCEDURE — 3075F SYST BP GE 130 - 139MM HG: CPT | Performed by: FAMILY MEDICINE

## 2025-02-20 RX ORDER — ATORVASTATIN CALCIUM 20 MG/1
20 TABLET, FILM COATED ORAL DAILY
Qty: 90 TABLET | Refills: 0 | Status: SHIPPED | OUTPATIENT
Start: 2025-02-20

## 2025-02-20 RX ORDER — NYSTATIN 100000 [USP'U]/ML
SUSPENSION ORAL
COMMUNITY
Start: 2024-09-05

## 2025-02-20 RX ORDER — METOPROLOL SUCCINATE 50 MG/1
50 TABLET, EXTENDED RELEASE ORAL
COMMUNITY
Start: 2024-12-04 | End: 2025-11-29

## 2025-02-20 RX ORDER — EZETIMIBE 10 MG/1
10 TABLET ORAL
COMMUNITY
Start: 2024-04-01 | End: 2025-04-01

## 2025-02-20 RX ORDER — DULOXETIN HYDROCHLORIDE 20 MG/1
1 CAPSULE, DELAYED RELEASE ORAL
COMMUNITY
Start: 2024-03-27 | End: 2025-02-20 | Stop reason: SDUPTHER

## 2025-02-20 RX ORDER — FLUTICASONE PROPIONATE 50 MCG
1 SPRAY, SUSPENSION (ML) NASAL
COMMUNITY
Start: 2024-03-19

## 2025-02-20 RX ORDER — ESCITALOPRAM OXALATE 10 MG/1
TABLET ORAL
COMMUNITY
End: 2025-02-20 | Stop reason: WASHOUT

## 2025-02-20 RX ORDER — HYDROXYZINE PAMOATE 25 MG/1
CAPSULE ORAL 3 TIMES DAILY PRN
COMMUNITY

## 2025-02-20 RX ORDER — FERROUS SULFATE 325(65) MG
1 TABLET ORAL DAILY
COMMUNITY

## 2025-02-20 RX ORDER — AZELASTINE 1 MG/ML
1 SPRAY, METERED NASAL 2 TIMES DAILY
COMMUNITY

## 2025-02-20 RX ORDER — DULOXETIN HYDROCHLORIDE 30 MG/1
30 CAPSULE, DELAYED RELEASE ORAL
Qty: 90 CAPSULE | Refills: 0 | Status: SHIPPED | OUTPATIENT
Start: 2025-02-20

## 2025-02-20 RX ORDER — LISINOPRIL 20 MG/1
20 TABLET ORAL
COMMUNITY
Start: 2024-09-17

## 2025-02-20 RX ORDER — GABAPENTIN 100 MG/1
CAPSULE ORAL
COMMUNITY
Start: 2024-03-27 | End: 2025-02-20 | Stop reason: WASHOUT

## 2025-02-20 NOTE — PROGRESS NOTES
"Subjective   Patient ID: Stephanie Fox is a 69 y.o. female who presents for New Patient Visit.    HPI     Pt states she would like to establish care and was referred her by her chiropractor.   Her last PCP was Dr. Agrawal at the [facility].    Pt states she had 4 total ischemic strokes which started in 2019.   She says she has not had any within the past several years.    Patient has a patent foramen ovale and surgical closure was not recommended for her.    Pt states she has some difficulty completing and verbalizing her thoughts due to the strokes.     Pt has been dealing with LT arm pain ever since she had her strokes. She says this is ongoing.  Patient was prescribe duloxetine and gabapentin for the pain in the past.  She had a very hard time stopping gabapentin in the past and thus did not want to take it again over fears of withdrawal like symptoms.  She never took the duloxetine that was prescribed in the past.  She states the pain feels like it goes to the bone.   She states she has a hard time doing some tasks and cannot lift her arm.   Physical therapy seem to make symptoms worse show she did not complete the therapy.    Pt underwent a left carotid endarterectomy 5/31/2022 with Dr. Lujan.    Patient uses marijuana to help her relax.     Patient states she sometimes sees a \"silver lining\" in her vision.  She states she has seen an eye specialist and was told she is started to develop cataracts.         Labs: 12/7/2023  Mamm: 12/28/2023  Carotid US: 5/2024    Patient has hypertension.   Patient does not monitor BP at home.   Denies CP, SOB, dizziness, and LE edema.   Patient is compliant with antihypertensive therapy and denies any noted side effects.    Patient has hyperlipidemia.   Patient is compliant with Zetia and denies any noted side effects.    She has been seeing a cardiologist.  Patient has a loop recorder to monitor for suspected Afib.       Review of Systems  Constitutional: Patient " "denies any fever, chills, loss of appetite, or unexplained weight loss.  HEENT: Denies any headache, sore throat, eye pain, ear pain, decreased vision, or decreased hearing. Patient also denies any rhinorrhea.  Cardiovascular: Patient denies any chest pain, shortness of breath with exertion, tachycardia, palpitations, orthopnea, or paroxysmal nocturnal dyspnea.  Respiratory: Patient denies any cough, shortness of breath, or wheezing.  Gastrointestinal: Patient denies any nausea, vomiting, diarrhea, constipation, melena, hematochezia, or reflux symptoms    Musculoskeletal: Patient denies any myalgia, arthralgia, joint swelling, or joint deformity  Left arm pain as noted in the HPI.    Skin: Denies any rashes or skin lesions.  Neurology: Patient denies any new motor or sensory losses. Denies any numbness, tingling, weakness, and incoordination of the extremities. Patient also denies any tremor, seizures, or gait instability.  Endocrinology: Denies any polyuria, polydipsia, polyphagia, or heat/cold intolerance.  Psychiatric: Denies any anxiety, depression, or suicidal/homicidal ideation.  Hematology: Patient denies any abnormal bruising or bleeding.    Objective   /83   Pulse 102   Temp 36.2 °C (97.2 °F)   Ht 1.588 m (5' 2.5\")   Wt 76.5 kg (168 lb 11.2 oz)   SpO2 96%   BMI 30.36 kg/m²     Physical Exam  Gen. Appearance: Alert and cooperative, no acute distress, well-developed/well-nourished overweight female.  Head: Normocephalic and atraumatic  EENT: Pupils are equal round reactive to light, extraocular muscles are intact, mucous membranes are moist, external auditory canals and tympanic membranes are within normal limits bilaterally, pharynx is without erythema or exudate, there is no noted rhinorrhea.    Neck: Supple and without adenopathy, no JVD at 90° and no carotid bruits are noted. There is no thyromegaly, thyroid tenderness, or palpable thyroid nodules.  Noted left CEA scar.    Cardiovascular: " Regular rate and rhythm without ectopy.  1/6 murmur at the RSB.    Respiratory: Clear to auscultation bilaterally with good air exchange.  Abdomen: Soft, nontender/nondistended. No masses, guarding, rebound, or rigidity. No hepatosplenomegaly, abdominal bruits, or CVA tenderness. Bowel sounds are normal. There is no widening of the aortic pulsation.    Musculoskeletal: Patient has good range of motion of the shoulders, elbows, wrists, hips, knees. There are no noted joint effusions or deformities.  Tenderness with palpation of the dorsal left forearm as well as the left bicep region without any noted swelling or deformity. No overlying skin changes.    Skin: Good turgor, moist, warm and without rashes or lesions.  Lymph nodes: No cervical, clavicular, or inguinal adenopathy.    Neurological exam: Alert and oriented ×3, no tremor, normal gait.  Patient has frequent word searching.     Psychiatric: Appropriate mood and affect, good insight and judgment, no delusions or thought disorders, no suicidal or homicidal ideation.  Extremities: No clubbing, cyanosis, or edema    Assessment/Plan   Essential hypertension, benign:  Blood pressure appears adequately controlled and we will continue with the current antihypertensive therapy.    Mixed hyperlipidemia:  Her LDL was 136 on 12/7/2023 labs.   She will discontinue the Zetia.  Recommended we start her on atorvastatin for additional LDL reduction.   Risks, benefits, and side effects of the medication were discussed at length.  Questions were answered to the patient's satisfaction the patient wishes to proceed with treatment.  - atorvastatin (Lipitor) 20 mg tablet. Take 1 tablet (20 mg) by mouth once daily. Dispense: 90 tablet; Refill: 0    Impaired fasting glucose:  Her glucose has been elevated on several past labs.   We will recheck with her next labs.     Encounter for screening mammogram for malignant neoplasm of breast:  Ordered mammogram screening.     Need for  hepatitis C screening test:  Ordered hepatitis C antibody to be done with her next labs of importance to Medicare recommendations to screen everyone born between 1945 and 1965.    Unsteady gait:  Will refer her to a Neurologist for an evaluation.     CVA, unspecified mechanism:  She has had 4 ischemic strokes in the past.   Will refer her Neurology for evaluation.    Arteriosclerosis of both carotid arteries:  Last carotid US completed 5/2024.  Had Left CEA 5/31/2022.    Left arm pain:  She has chronic left arm pain due to her past strokes.   Had bad experience with gabapentin withdrawal symptoms in the past and will not consider trying it again.  Discussed duloxetine trial.   Risks, benefits, and side effects of the medication were discussed at length.  Questions were answered to the patient's satisfaction the patient wishes to proceed with treatment.  Will start her on 30 mg duloxetine once a day and titrate if needed and tolerated.  We discussed that we are using the duloxetine to address her arm pain and not depression.  Will refer her to Neurology for evaluation.  -  duloxetine (Cymbalta) 30 mg DR capsule. Take 1 capsule (30 mg) by mouth early in the morning. Dispense: 90 capsule; Refill: 0      Scribe Attestation  By signing my name below, I, Cristina Oneil   attest that this documentation has been prepared under the direction and in the presence of Edilberto Wheeler DO.       Orders Placed This Encounter   Procedures    BI mammo bilateral screening tomosynthesis    Comprehensive Metabolic Panel    Lipid Panel    Hemoglobin A1C    TSH with reflex to Free T4 if abnormal    Hepatitis C Antibody    Referral to Neurology     Requested Prescriptions     Signed Prescriptions Disp Refills    atorvastatin (Lipitor) 20 mg tablet 90 tablet 0     Sig: Take 1 tablet (20 mg) by mouth once daily.    DULoxetine (Cymbalta) 30 mg DR capsule 90 capsule 0     Sig: Take 1 capsule (30 mg) by mouth early in the morning..

## 2025-02-20 NOTE — PATIENT INSTRUCTIONS
We will start the atorvastatin to help lower the cholesterol better.    We will refer you to a neurologist to help manage and evaluate the neurological symptoms.    Start the duloxetine (low dose) to see if it will help with the pain you are having.    Mammogram was ordered.  You can this at our facility.      Follow up in 3 months with labs to be done PRIOR.    It was a pleasure to see you today. Thank you for choosing us for your health care needs.    If you have lab or other testing completed and have not been informed of results within one week, please call the office for your results.    If you haven't done so, consider signing up for [facility], the [facility] personal health record. Ask the staff how you can get started.

## 2025-03-24 ENCOUNTER — APPOINTMENT (OUTPATIENT)
Dept: RADIOLOGY | Facility: HOSPITAL | Age: 70
End: 2025-03-24
Payer: MEDICARE

## 2025-04-01 ENCOUNTER — HOSPITAL ENCOUNTER (OUTPATIENT)
Dept: RADIOLOGY | Facility: HOSPITAL | Age: 70
Discharge: HOME | End: 2025-04-01
Payer: MEDICARE

## 2025-04-01 VITALS — WEIGHT: 167 LBS | BODY MASS INDEX: 30.06 KG/M2

## 2025-04-01 DIAGNOSIS — Z12.31 ENCOUNTER FOR SCREENING MAMMOGRAM FOR MALIGNANT NEOPLASM OF BREAST: ICD-10-CM

## 2025-04-01 PROCEDURE — 77067 SCR MAMMO BI INCL CAD: CPT

## 2025-04-07 ENCOUNTER — HOSPITAL ENCOUNTER (OUTPATIENT)
Dept: RADIOLOGY | Facility: EXTERNAL LOCATION | Age: 70
Discharge: HOME | End: 2025-04-07
Payer: MEDICARE

## 2025-04-17 ENCOUNTER — OFFICE VISIT (OUTPATIENT)
Dept: URGENT CARE | Age: 70
End: 2025-04-17
Payer: MEDICARE

## 2025-04-17 VITALS
SYSTOLIC BLOOD PRESSURE: 128 MMHG | TEMPERATURE: 98.8 F | OXYGEN SATURATION: 97 % | RESPIRATION RATE: 20 BRPM | HEART RATE: 86 BPM | WEIGHT: 162 LBS | DIASTOLIC BLOOD PRESSURE: 75 MMHG | BODY MASS INDEX: 29.81 KG/M2 | HEIGHT: 62 IN

## 2025-04-17 DIAGNOSIS — H66.91 ACUTE OTITIS MEDIA, RIGHT: ICD-10-CM

## 2025-04-17 DIAGNOSIS — R50.9 FEVER, UNSPECIFIED FEVER CAUSE: ICD-10-CM

## 2025-04-17 DIAGNOSIS — J40 BRONCHITIS: Primary | ICD-10-CM

## 2025-04-17 PROBLEM — K80.20 CALCULUS OF GALLBLADDER WITHOUT CHOLECYSTITIS WITHOUT OBSTRUCTION: Status: ACTIVE | Noted: 2020-02-18

## 2025-04-17 PROBLEM — R06.00 DYSPNEA: Status: ACTIVE | Noted: 2025-04-17

## 2025-04-17 PROBLEM — K31.84 GASTROPARESIS: Chronic | Status: ACTIVE | Noted: 2020-02-18

## 2025-04-17 PROBLEM — G31.84 MILD COGNITIVE IMPAIRMENT: Chronic | Status: ACTIVE | Noted: 2020-02-18

## 2025-04-17 PROBLEM — K59.09 CHRONIC CONSTIPATION: Chronic | Status: ACTIVE | Noted: 2020-02-18

## 2025-04-17 LAB
POC CORONAVIRUS SARS-COV-2 PCR: NEGATIVE
POC HUMAN RHINOVIRUS PCR: NEGATIVE
POC INFLUENZA A VIRUS PCR: NEGATIVE
POC INFLUENZA B VIRUS PCR: NEGATIVE
POC RESPIRATORY SYNCYTIAL VIRUS PCR: NEGATIVE

## 2025-04-17 RX ORDER — BROMPHENIRAMINE MALEATE, PSEUDOEPHEDRINE HYDROCHLORIDE, AND DEXTROMETHORPHAN HYDROBROMIDE 2; 30; 10 MG/5ML; MG/5ML; MG/5ML
10 SYRUP ORAL 4 TIMES DAILY PRN
Qty: 200 ML | Refills: 0 | Status: SHIPPED | OUTPATIENT
Start: 2025-04-17 | End: 2025-04-27

## 2025-04-17 RX ORDER — OMEPRAZOLE 40 MG/1
CAPSULE, DELAYED RELEASE ORAL
COMMUNITY
Start: 2025-04-15

## 2025-04-17 RX ORDER — PREDNISONE 20 MG/1
20 TABLET ORAL DAILY
Qty: 5 TABLET | Refills: 0 | Status: SHIPPED | OUTPATIENT
Start: 2025-04-17 | End: 2025-04-22

## 2025-04-17 RX ORDER — AMOXICILLIN AND CLAVULANATE POTASSIUM 875; 125 MG/1; MG/1
875 TABLET, FILM COATED ORAL 2 TIMES DAILY
Qty: 20 TABLET | Refills: 0 | Status: SHIPPED | OUTPATIENT
Start: 2025-04-17

## 2025-04-17 RX ORDER — ROSUVASTATIN CALCIUM 40 MG/1
1 TABLET, COATED ORAL NIGHTLY
COMMUNITY
Start: 2025-02-06 | End: 2025-08-05

## 2025-04-17 ASSESSMENT — ENCOUNTER SYMPTOMS
CHILLS: 1
TROUBLE SWALLOWING: 0
NAUSEA: 0
COUGH: 1
SINUS PRESSURE: 1
FATIGUE: 1
DIARRHEA: 0
SORE THROAT: 0
VOMITING: 0
GASTROINTESTINAL NEGATIVE: 1
ABDOMINAL PAIN: 0
CARDIOVASCULAR NEGATIVE: 1
RHINORRHEA: 1
FEVER: 1
HEADACHES: 0
VOICE CHANGE: 0

## 2025-04-17 NOTE — PROGRESS NOTES
"Subjective   Patient ID: Stephanie Fox is a 69 y.o. female. They present today with a chief complaint of Cough (Productive cough, fever, chills, bodyaches, diarrhea x Monday ).    History of Present Illness  Subjective  History was provided by the patient.  Stephanie Fox is a 69 y.o. female who presents for evaluation of symptoms of a URI. Symptoms include low grade fevers, dry cough, nasal blockage, post nasal drip, and sinus and nasal congestion. Onset of symptoms was 3 days ago, unchanged since that time. Associated symptoms include right ear pressure/pain. She is drinking moderate amounts of fluids. Evaluation to date: none. Treatment to date: cough suppressants    Objective  /75 (BP Location: Right arm, Patient Position: Sitting, BP Cuff Size: Adult)   Pulse 86   Temp 37.1 °C (98.8 °F) (Temporal)   Resp 20   Ht 1.575 m (5' 2\")   Wt 73.5 kg (162 lb)   SpO2 97%   BMI 29.63 kg/m²   [unfilled]     Assessment/Plan  bronchitis and otitis media    Suggested symptomatic OTC remedies.  Nasal saline spray for congestion.  Augmentin per orders.  Follow up as needed.        History provided by:  Patient and medical records  Cough  Associated symptoms include chills, ear pain, a fever and rhinorrhea. Pertinent negatives include no headaches or sore throat.       Past Medical History  Allergies as of 04/17/2025 - Reviewed 04/17/2025   Allergen Reaction Noted    Lactose GI Upset and Nausea/vomiting 02/10/2020    Sucralfate Other 02/18/2020    Codeine Hives and Itching 12/20/2004       Prescriptions Prior to Admission[1]     Medical History[2]    Surgical History[3]     reports that she has never smoked. She has never used smokeless tobacco. She reports that she does not currently use alcohol. She reports current drug use. Drug: Marijuana.    Review of Systems  Review of Systems   Constitutional:  Positive for chills, fatigue and fever.   HENT:  Positive for congestion, ear pain, rhinorrhea and sinus pressure. " "Negative for sore throat, trouble swallowing and voice change.    Respiratory:  Positive for cough.    Cardiovascular: Negative.    Gastrointestinal: Negative.  Negative for abdominal pain, diarrhea, nausea and vomiting.   Genitourinary: Negative.    Neurological:  Negative for headaches.   All other systems reviewed and are negative.                                 Objective    Vitals:    04/17/25 1618   BP: 128/75   BP Location: Right arm   Patient Position: Sitting   BP Cuff Size: Adult   Pulse: 86   Resp: 20   Temp: 37.1 °C (98.8 °F)   TempSrc: Temporal   SpO2: 97%   Weight: 73.5 kg (162 lb)   Height: 1.575 m (5' 2\")     No LMP recorded. Patient has had a hysterectomy.    Physical Exam  Vitals and nursing note reviewed.   Constitutional:       General: She is not in acute distress.     Appearance: Normal appearance. She is ill-appearing.   HENT:      Head: Normocephalic and atraumatic.      Right Ear: A middle ear effusion is present. Tympanic membrane is erythematous.      Left Ear: A middle ear effusion is present. Tympanic membrane is erythematous.      Nose: Mucosal edema, congestion and rhinorrhea present. Rhinorrhea is clear.      Right Turbinates: Swollen.      Left Turbinates: Swollen.      Mouth/Throat:      Lips: Pink.      Mouth: Mucous membranes are moist.      Pharynx: Uvula midline. Posterior oropharyngeal erythema and postnasal drip present.   Cardiovascular:      Rate and Rhythm: Normal rate and regular rhythm.      Pulses: Normal pulses.      Heart sounds: Normal heart sounds.   Pulmonary:      Effort: Pulmonary effort is normal.      Breath sounds: Normal air entry. Examination of the right-upper field reveals wheezing. Examination of the left-upper field reveals wheezing. Examination of the right-middle field reveals wheezing. Wheezing present. No decreased breath sounds.   Musculoskeletal:      Cervical back: Normal range of motion and neck supple.   Skin:     General: Skin is warm and dry. "      Capillary Refill: Capillary refill takes less than 2 seconds.   Neurological:      Mental Status: She is alert and oriented to person, place, and time.   Psychiatric:         Behavior: Behavior normal.         Procedures    Point of Care Test & Imaging Results from this visit  Results for orders placed or performed in visit on 04/17/25   POCT SPOTFIRE R/ST Panel Mini w/COVID ([facility]) manually resulted    Specimen: Swab   Result Value Ref Range    POC Sars-Cov-2 PCR Negative Negative    POC Respiratory Syncytial Virus PCR Negative Negative    POC Influenza A Virus PCR Negative Negative    POC Influenza B Virus PCR Negative Negative    POC Human Rhinovirus PCR Negative Negative      Imaging  No results found.    Cardiology, Vascular, and Other Imaging  No other imaging results found for the past 2 days      Diagnostic study results (if any) were reviewed by ANAI Dumont.    Assessment/Plan   Allergies, medications, history, and pertinent labs/EKGs/Imaging reviewed by ANAI Dumont.     Medical Decision Making  Risks, benefits, and alternatives of the medications and treatment plan prescribed today were discussed, and patient expressed understanding. Plan follow up as discussed or as needed if any worsening symptoms or change in condition. Reinforced red flags including (but not limited to): severe or worsening pain; difficulty swallowing; stiff neck; shortness of breath; coughing or vomiting blood; chest pain; and new or increased fever are indications to go to the Emergency Department.  At time of discharge patient was clinically well-appearing and HDS for outpatient management. The patient and/or family was educated regarding diagnosis, supportive care, OTC and Rx medications. The patient and/or family was given the opportunity to ask questions prior to discharge.  They verbalized understanding of my discussion of the plans for treatment, expected course, indications to return  to UC or seek further evaluation in ED, and the need for timely follow up as directed.   They were provided with a work/school excuse if requested. The after-visit summary was given to the patient and care instructions were reviewed with the patient. All questions were answered and the patient verbalized understanding of the plan of care for today.  Plan:  Recent visit notes reviewed  Meds as above  Increase clear fluids  Pcp follow up this week if not improving or worsening  ER visit anytime 24/7 for acute worsening or changing condition      Orders and Diagnoses  Diagnoses and all orders for this visit:  Bronchitis  -     brompheniramine-pseudoeph-DM 2-30-10 mg/5 mL syrup; Take 10 mL by mouth 4 times a day as needed for cough or congestion for up to 10 days.  -     predniSONE (Deltasone) 20 mg tablet; Take 1 tablet (20 mg) by mouth once daily for 5 days.  -     amoxicillin-clavulanate (Augmentin) 875-125 mg tablet; Take 1 tablet (875 mg) by mouth 2 times a day.  Fever, unspecified fever cause  -     POCT SPOTFIRE R/ST Panel Mini w/COVID ([facility]) manually resulted  Acute otitis media, right  -     brompheniramine-pseudoeph-DM 2-30-10 mg/5 mL syrup; Take 10 mL by mouth 4 times a day as needed for cough or congestion for up to 10 days.  -     predniSONE (Deltasone) 20 mg tablet; Take 1 tablet (20 mg) by mouth once daily for 5 days.  -     amoxicillin-clavulanate (Augmentin) 875-125 mg tablet; Take 1 tablet (875 mg) by mouth 2 times a day.      Medical Admin Record      Patient disposition: Home    Electronically signed by ANAI Dumont  5:10 PM           [1] (Not in a hospital admission)   [2]   Past Medical History:  Diagnosis Date    Atrial septal defect ([facility])    [3]   Past Surgical History:  Procedure Laterality Date    BUNIONECTOMY Bilateral     CAROTID ENDARTERECTOMY Left 05/31/2022    Dr. Lujan    HYSTERECTOMY      TUBAL LIGATION  1970    WISDOM TOOTH EXTRACTION

## 2025-05-05 DIAGNOSIS — E78.2 MIXED HYPERLIPIDEMIA: ICD-10-CM

## 2025-05-05 DIAGNOSIS — M79.602 LEFT ARM PAIN: ICD-10-CM

## 2025-05-05 RX ORDER — DULOXETIN HYDROCHLORIDE 30 MG/1
30 CAPSULE, DELAYED RELEASE ORAL
Qty: 90 CAPSULE | Refills: 0 | OUTPATIENT
Start: 2025-05-05

## 2025-05-05 RX ORDER — ATORVASTATIN CALCIUM 20 MG/1
20 TABLET, FILM COATED ORAL DAILY
Qty: 90 TABLET | Refills: 0 | OUTPATIENT
Start: 2025-05-05

## 2025-05-05 NOTE — TELEPHONE ENCOUNTER
"Medication refused due to failing protocol.    Requested Prescriptions   Pending Prescriptions Disp Refills    DULoxetine (Cymbalta) 30 mg DR capsule [Pharmacy Med Name: duloxetine 30 mg capsule,delayed release] 90 capsule 0     Sig: Take 1 capsule (30 mg) by mouth early in the morning..        SNRI Protocol Failed - 5/5/2025 11:23 AM        Failed - Creatinine Clearance on record in the past 12 months     No results found for: \"CREAT GINNY\", \"CREATININE CLEARANCE\"          Failed - Depression Screening on record in the past 12 months        Failed - AST and ALT on file in the past 12 months     AST   Date Value Ref Range Status   06/02/2022 13 9 - 39 U/L Final     ALT (SGPT)   Date Value Ref Range Status   06/02/2022 7 7 - 45 U/L Final     Comment:      Patients treated with Sulfasalazine may generate    falsely decreased results for ALT.               Passed - Blood pressure on record in past 15 months     BP Readings from Last 3 Encounters:   04/17/25 128/75   02/20/25 132/83   06/16/22 132/83               Passed - No active pregnancy on record        Passed - No pregnancy test in the past 12 months or most recent test was negative        Passed - Visit with relevant provider in past 12 months or upcoming 90 days     Recent Visits  Date Type Provider Dept   02/20/25 Office Visit Edilberto Wheeler DO [facility] [id number]   Showing recent visits within past 365 days and meeting all other requirements  Future Appointments  Date Type Provider Dept   05/28/25 Appointment Edilberto Wheeler DO [facility] [id number]   Showing future appointments within next 90 days and meeting all other requirements              Passed - Medication not refilled in past 45 days (1.5 months)     No matching medication orders between 3/21/2025 11:23 AM and 5/5/2025 11:23 AM               atorvastatin (Lipitor) 20 mg tablet [Pharmacy Med Name: atorvastatin 20 mg tablet] 90 tablet 0     Sig: Take 1 tablet (20 mg) by mouth once daily.        Hmg CoA " Reductase Inhibitors Protocol Failed - 5/5/2025 11:23 AM        Failed - Lipid panel in past 12 months     LDL   Date Value Ref Range Status   05/25/2022 137 (H) 0 - 99 mg/dL Final     Comment:     .                           NEAR      BORD      AGE      DESIRABLE  OPTIMAL    HIGH     HIGH     VERY HIGH     0-19 Y     0 - 109     ---    110-129   >/= 130     ----    20-24 Y     0 - 119     ---    120-159   >/= 160     ----      >24 Y     0 -  99   100-129  130-159   160-189     >/=190  .       HDL   Date Value Ref Range Status   05/25/2022 79.0 mg/dL Final     Comment:     .      AGE      VERY LOW   LOW     NORMAL    HIGH       0-19 Y       < 35   < 40     40-45     ----    20-24 Y       ----   < 40       >45     ----      >24 Y       ----   < 40     40-60      >60  .       Cholesterol   Date Value Ref Range Status   05/25/2022 229 (H) 0 - 199 mg/dL Final     Comment:     .      AGE      DESIRABLE   BORDERLINE HIGH   HIGH     0-19 Y     0 - 169       170 - 199     >/= 200    20-24 Y     0 - 189       190 - 224     >/= 225         >24 Y     0 - 199       200 - 239     >/= 240   **All ranges are based on fasting samples. Specific   therapeutic targets will vary based on patient-specific   cardiac risk.  .   Pediatric guidelines reference:Pediatrics 2011, 128(S5).   Adult guidelines reference: NCEP ATPIII Guidelines,     JAYESH 2001, 258:2486-97  .   Venipuncture immediately after or during the    administration of Metamizole may lead to falsely   low results. Testing should be performed immediately   prior to Metamizole dosing.       Cholesterol/HDL Ratio   Date Value Ref Range Status   05/25/2022 2.9  Final     Comment:     REF VALUES  DESIRABLE  < 3.4  HIGH RISK  > 5.0       Triglycerides   Date Value Ref Range Status   05/25/2022 66 0 - 149 mg/dL Final     Comment:     .      AGE      DESIRABLE   BORDERLINE HIGH   HIGH     VERY HIGH   0 D-90 D    19 - 174         ----         ----        ----  91 D- 9 Y     0 -  74    "     75 -  99     >/= 100      ----    10-19 Y     0 -  89        90 - 129     >/= 130      ----    20-24 Y     0 - 114       115 - 149     >/= 150      ----         >24 Y     0 - 149       150 - 199    200- 499    >/= 500  .   Venipuncture immediately after or during the    administration of Metamizole may lead to falsely   low results. Testing should be performed immediately   prior to Metamizole dosing.               Failed - Normal creatine kinase in past 12 months     No results found for: \"CK TOTAL\"          Failed - Normal AST or ALT on file in past 12 months     AST   Date Value Ref Range Status   06/02/2022 13 9 - 39 U/L Final     ALT (SGPT)   Date Value Ref Range Status   06/02/2022 7 7 - 45 U/L Final     Comment:      Patients treated with Sulfasalazine may generate    falsely decreased results for ALT.               Failed - Medication not refilled in past 45 days (1.5 months)     Matching medication order placed on 4/17/2025  4:50 PM  Order [id number]: rosuvastatin (Crestor) 40 mg tablet  (For orders placed between 3/21/2025 11:23 AM and 5/5/2025 11:23 AM)            Passed - No active pregnancy on record        Passed - No pregnancy test in the past 12 months or most recent test was negative        Passed - Visit with relevant provider in past 12 months or upcoming 90 days              "

## 2025-05-13 DIAGNOSIS — M79.602 LEFT ARM PAIN: ICD-10-CM

## 2025-05-13 DIAGNOSIS — E78.2 MIXED HYPERLIPIDEMIA: ICD-10-CM

## 2025-05-13 RX ORDER — ATORVASTATIN CALCIUM 20 MG/1
20 TABLET, FILM COATED ORAL DAILY
Qty: 90 TABLET | Refills: 0 | OUTPATIENT
Start: 2025-05-13

## 2025-05-13 RX ORDER — DULOXETIN HYDROCHLORIDE 30 MG/1
30 CAPSULE, DELAYED RELEASE ORAL
Qty: 90 CAPSULE | Refills: 0 | OUTPATIENT
Start: 2025-05-13

## 2025-05-13 NOTE — TELEPHONE ENCOUNTER
"Medication refused due to failing protocol.    Requested Prescriptions   Pending Prescriptions Disp Refills    DULoxetine (Cymbalta) 30 mg DR capsule [Pharmacy Med Name: duloxetine 30 mg capsule,delayed release] 90 capsule 0     Sig: Take 1 capsule (30 mg) by mouth early in the morning..        SNRI Protocol Failed - 5/13/2025 11:18 AM        Failed - Creatinine Clearance on record in the past 12 months     No results found for: \"CREAT GINNY\", \"CREATININE CLEARANCE\"          Failed - Depression Screening on record in the past 12 months        Failed - AST and ALT on file in the past 12 months     AST   Date Value Ref Range Status   06/02/2022 13 9 - 39 U/L Final     ALT (SGPT)   Date Value Ref Range Status   06/02/2022 7 7 - 45 U/L Final     Comment:      Patients treated with Sulfasalazine may generate    falsely decreased results for ALT.               Passed - Blood pressure on record in past 15 months     BP Readings from Last 3 Encounters:   04/17/25 128/75   02/20/25 132/83   06/16/22 132/83               Passed - No active pregnancy on record        Passed - No pregnancy test in the past 12 months or most recent test was negative        Passed - Visit with relevant provider in past 12 months or upcoming 90 days     Recent Visits  Date Type Provider Dept   02/20/25 Office Visit Edilberto Wheeler DO [facility] [id number]   Showing recent visits within past 365 days and meeting all other requirements  Future Appointments  Date Type Provider Dept   05/28/25 Appointment Edilberto Wheeler DO [facility] [id number] [facility]   Showing future appointments within next 90 days and meeting all other requirements              Passed - Medication not refilled in past 45 days (1.5 months)     No matching medication orders between 3/29/2025 11:18 AM and 5/13/2025 11:18 AM               atorvastatin (Lipitor) 20 mg tablet [Pharmacy Med Name: atorvastatin 20 mg tablet] 90 tablet 0     Sig: Take 1 tablet (20 mg) by mouth once daily.        Hmg CoA " Reductase Inhibitors Protocol Failed - 5/13/2025 11:18 AM        Failed - Lipid panel in past 12 months     LDL   Date Value Ref Range Status   05/25/2022 137 (H) 0 - 99 mg/dL Final     Comment:     .                           NEAR      BORD      AGE      DESIRABLE  OPTIMAL    HIGH     HIGH     VERY HIGH     0-19 Y     0 - 109     ---    110-129   >/= 130     ----    20-24 Y     0 - 119     ---    120-159   >/= 160     ----      >24 Y     0 -  99   100-129  130-159   160-189     >/=190  .       HDL   Date Value Ref Range Status   05/25/2022 79.0 mg/dL Final     Comment:     .      AGE      VERY LOW   LOW     NORMAL    HIGH       0-19 Y       < 35   < 40     40-45     ----    20-24 Y       ----   < 40       >45     ----      >24 Y       ----   < 40     40-60      >60  .       Cholesterol   Date Value Ref Range Status   05/25/2022 229 (H) 0 - 199 mg/dL Final     Comment:     .      AGE      DESIRABLE   BORDERLINE HIGH   HIGH     0-19 Y     0 - 169       170 - 199     >/= 200    20-24 Y     0 - 189       190 - 224     >/= 225         >24 Y     0 - 199       200 - 239     >/= 240   **All ranges are based on fasting samples. Specific   therapeutic targets will vary based on patient-specific   cardiac risk.  .   Pediatric guidelines reference:Pediatrics 2011, 128(S5).   Adult guidelines reference: NCEP ATPIII Guidelines,     JAYESH 2001, 258:2486-97  .   Venipuncture immediately after or during the    administration of Metamizole may lead to falsely   low results. Testing should be performed immediately   prior to Metamizole dosing.       Cholesterol/HDL Ratio   Date Value Ref Range Status   05/25/2022 2.9  Final     Comment:     REF VALUES  DESIRABLE  < 3.4  HIGH RISK  > 5.0       Triglycerides   Date Value Ref Range Status   05/25/2022 66 0 - 149 mg/dL Final     Comment:     .      AGE      DESIRABLE   BORDERLINE HIGH   HIGH     VERY HIGH   0 D-90 D    19 - 174         ----         ----        ----  91 D- 9 Y     0 -  74   "      75 -  99     >/= 100      ----    10-19 Y     0 -  89        90 - 129     >/= 130      ----    20-24 Y     0 - 114       115 - 149     >/= 150      ----         >24 Y     0 - 149       150 - 199    200- 499    >/= 500  .   Venipuncture immediately after or during the    administration of Metamizole may lead to falsely   low results. Testing should be performed immediately   prior to Metamizole dosing.               Failed - Normal creatine kinase in past 12 months     No results found for: \"CK TOTAL\"          Failed - Normal AST or ALT on file in past 12 months     AST   Date Value Ref Range Status   06/02/2022 13 9 - 39 U/L Final     ALT (SGPT)   Date Value Ref Range Status   06/02/2022 7 7 - 45 U/L Final     Comment:      Patients treated with Sulfasalazine may generate    falsely decreased results for ALT.               Failed - Medication not refilled in past 45 days (1.5 months)     Matching medication order placed on 4/17/2025  4:50 PM  Order [id number]: rosuvastatin (Crestor) 40 mg tablet  (For orders placed between 3/29/2025 11:18 AM and 5/13/2025 11:18 AM)            Passed - No active pregnancy on record        Passed - No pregnancy test in the past 12 months or most recent test was negative        Passed - Visit with relevant provider in past 12 months or upcoming 90 days              "

## 2025-05-20 DIAGNOSIS — I10 ESSENTIAL HYPERTENSION, BENIGN: ICD-10-CM

## 2025-05-20 DIAGNOSIS — R73.01 IMPAIRED FASTING GLUCOSE: ICD-10-CM

## 2025-05-20 DIAGNOSIS — E78.2 MIXED HYPERLIPIDEMIA: ICD-10-CM

## 2025-05-20 DIAGNOSIS — Z11.59 NEED FOR HEPATITIS C SCREENING TEST: ICD-10-CM

## 2025-05-28 ENCOUNTER — APPOINTMENT (OUTPATIENT)
Dept: PRIMARY CARE | Facility: CLINIC | Age: 70
End: 2025-05-28
Payer: MEDICARE

## 2025-06-23 ENCOUNTER — APPOINTMENT (OUTPATIENT)
Dept: NEUROLOGY | Facility: CLINIC | Age: 70
End: 2025-06-23
Payer: MEDICARE